# Patient Record
Sex: MALE | URBAN - METROPOLITAN AREA
[De-identification: names, ages, dates, MRNs, and addresses within clinical notes are randomized per-mention and may not be internally consistent; named-entity substitution may affect disease eponyms.]

---

## 2023-03-27 ENCOUNTER — APPOINTMENT (OUTPATIENT)
Dept: URBAN - METROPOLITAN AREA CLINIC 184 | Age: 73
Setting detail: DERMATOLOGY
End: 2023-03-27

## 2023-03-27 DIAGNOSIS — D22 MELANOCYTIC NEVI: ICD-10-CM

## 2023-03-27 DIAGNOSIS — L57.8 OTHER SKIN CHANGES DUE TO CHRONIC EXPOSURE TO NONIONIZING RADIATION: ICD-10-CM

## 2023-03-27 DIAGNOSIS — L81.4 OTHER MELANIN HYPERPIGMENTATION: ICD-10-CM

## 2023-03-27 DIAGNOSIS — L82.1 OTHER SEBORRHEIC KERATOSIS: ICD-10-CM

## 2023-03-27 DIAGNOSIS — L57.0 ACTINIC KERATOSIS: ICD-10-CM

## 2023-03-27 DIAGNOSIS — Z85.828 PERSONAL HISTORY OF OTHER MALIGNANT NEOPLASM OF SKIN: ICD-10-CM

## 2023-03-27 DIAGNOSIS — D18.0 HEMANGIOMA: ICD-10-CM

## 2023-03-27 PROBLEM — D18.01 HEMANGIOMA OF SKIN AND SUBCUTANEOUS TISSUE: Status: ACTIVE | Noted: 2023-03-27

## 2023-03-27 PROBLEM — D22.39 MELANOCYTIC NEVI OF OTHER PARTS OF FACE: Status: ACTIVE | Noted: 2023-03-27

## 2023-03-27 PROBLEM — D22.61 MELANOCYTIC NEVI OF RIGHT UPPER LIMB, INCLUDING SHOULDER: Status: ACTIVE | Noted: 2023-03-27

## 2023-03-27 PROBLEM — D22.62 MELANOCYTIC NEVI OF LEFT UPPER LIMB, INCLUDING SHOULDER: Status: ACTIVE | Noted: 2023-03-27

## 2023-03-27 PROBLEM — D22.5 MELANOCYTIC NEVI OF TRUNK: Status: ACTIVE | Noted: 2023-03-27

## 2023-03-27 PROCEDURE — 17003 DESTRUCT PREMALG LES 2-14: CPT

## 2023-03-27 PROCEDURE — OTHER MIPS QUALITY: OTHER

## 2023-03-27 PROCEDURE — OTHER COUNSELING: OTHER

## 2023-03-27 PROCEDURE — 17000 DESTRUCT PREMALG LESION: CPT

## 2023-03-27 PROCEDURE — 99213 OFFICE O/P EST LOW 20 MIN: CPT | Mod: 25

## 2023-03-27 PROCEDURE — OTHER SUNSCREEN RECOMMENDATIONS: OTHER

## 2023-03-27 PROCEDURE — OTHER LIQUID NITROGEN: OTHER

## 2023-03-27 PROCEDURE — OTHER OBSERVATION: OTHER

## 2023-03-27 ASSESSMENT — LOCATION SIMPLE DESCRIPTION DERM
LOCATION SIMPLE: RIGHT FOREHEAD
LOCATION SIMPLE: SCALP
LOCATION SIMPLE: RIGHT UPPER ARM
LOCATION SIMPLE: RIGHT EAR
LOCATION SIMPLE: INFERIOR FOREHEAD
LOCATION SIMPLE: LEFT FOREHEAD
LOCATION SIMPLE: CHEST
LOCATION SIMPLE: LEFT UPPER ARM

## 2023-03-27 ASSESSMENT — LOCATION ZONE DERM
LOCATION ZONE: TRUNK
LOCATION ZONE: EAR
LOCATION ZONE: ARM
LOCATION ZONE: SCALP
LOCATION ZONE: FACE

## 2023-03-27 ASSESSMENT — LOCATION DETAILED DESCRIPTION DERM
LOCATION DETAILED: LEFT CENTRAL PARIETAL SCALP
LOCATION DETAILED: STERNUM
LOCATION DETAILED: INFERIOR MID FOREHEAD
LOCATION DETAILED: RIGHT FOREHEAD
LOCATION DETAILED: LEFT INFERIOR FOREHEAD
LOCATION DETAILED: LEFT SUPERIOR PARIETAL SCALP
LOCATION DETAILED: LEFT MEDIAL INFERIOR CHEST
LOCATION DETAILED: LEFT PROXIMAL POSTERIOR UPPER ARM
LOCATION DETAILED: RIGHT PROXIMAL POSTERIOR UPPER ARM
LOCATION DETAILED: RIGHT ANTIHELIX

## 2023-03-27 NOTE — PROCEDURE: LIQUID NITROGEN
Show Applicator Variable?: Yes
Render Note In Bullet Format When Appropriate: No
Post-Care Instructions: I reviewed with the patient in detail post-care instructions. Patient is to wear sunprotection, and avoid picking at any of the treated lesions. Pt may apply Vaseline to crusted or scabbing areas.
Duration Of Freeze Thaw-Cycle (Seconds): 2
Detail Level: Detailed
Consent: The patient's consent was obtained including but not limited to risks of crusting, scabbing, blistering, scarring, darker or lighter pigmentary change, recurrence, incomplete removal and infection.

## 2023-03-27 NOTE — PROCEDURE: MIPS QUALITY
Detail Level: Detailed
Quality 431: Preventive Care And Screening: Unhealthy Alcohol Use - Screening: Patient not identified as an unhealthy alcohol user when screened for unhealthy alcohol use using a systematic screening method
Quality 226: Preventive Care And Screening: Tobacco Use: Screening And Cessation Intervention: Tobacco Screening not Performed
Quality 130: Documentation Of Current Medications In The Medical Record: Current Medications Documented

## 2023-03-27 NOTE — PROCEDURE: OBSERVATION
Body Location Override (Optional - Billing Will Still Be Based On Selected Body Map Location If Applicable): right forehead
Detail Level: Detailed
Size Of Lesion In Cm (Optional): 0

## 2024-05-08 ENCOUNTER — APPOINTMENT (OUTPATIENT)
Dept: URBAN - METROPOLITAN AREA CLINIC 184 | Age: 74
Setting detail: DERMATOLOGY
End: 2024-05-08

## 2024-05-08 DIAGNOSIS — D22 MELANOCYTIC NEVI: ICD-10-CM

## 2024-05-08 DIAGNOSIS — L81.4 OTHER MELANIN HYPERPIGMENTATION: ICD-10-CM

## 2024-05-08 DIAGNOSIS — L57.0 ACTINIC KERATOSIS: ICD-10-CM

## 2024-05-08 DIAGNOSIS — D18.0 HEMANGIOMA: ICD-10-CM

## 2024-05-08 DIAGNOSIS — L57.8 OTHER SKIN CHANGES DUE TO CHRONIC EXPOSURE TO NONIONIZING RADIATION: ICD-10-CM

## 2024-05-08 DIAGNOSIS — L82.1 OTHER SEBORRHEIC KERATOSIS: ICD-10-CM

## 2024-05-08 DIAGNOSIS — L72.8 OTHER FOLLICULAR CYSTS OF THE SKIN AND SUBCUTANEOUS TISSUE: ICD-10-CM

## 2024-05-08 PROBLEM — D18.01 HEMANGIOMA OF SKIN AND SUBCUTANEOUS TISSUE: Status: ACTIVE | Noted: 2024-05-08

## 2024-05-08 PROBLEM — D22.9 MELANOCYTIC NEVI, UNSPECIFIED: Status: ACTIVE | Noted: 2024-05-08

## 2024-05-08 PROCEDURE — OTHER COUNSELING: OTHER

## 2024-05-08 PROCEDURE — 99213 OFFICE O/P EST LOW 20 MIN: CPT | Mod: 25

## 2024-05-08 PROCEDURE — OTHER SUNSCREEN RECOMMENDATIONS: OTHER

## 2024-05-08 PROCEDURE — 17003 DESTRUCT PREMALG LES 2-14: CPT

## 2024-05-08 PROCEDURE — OTHER MIPS QUALITY: OTHER

## 2024-05-08 PROCEDURE — 17000 DESTRUCT PREMALG LESION: CPT

## 2024-05-08 PROCEDURE — OTHER LIQUID NITROGEN: OTHER

## 2024-05-08 ASSESSMENT — LOCATION ZONE DERM
LOCATION ZONE: TRUNK
LOCATION ZONE: SCALP

## 2024-05-08 ASSESSMENT — LOCATION DETAILED DESCRIPTION DERM
LOCATION DETAILED: RIGHT SUPERIOR PARIETAL SCALP
LOCATION DETAILED: LEFT SUPERIOR PARIETAL SCALP
LOCATION DETAILED: LEFT INFERIOR UPPER BACK
LOCATION DETAILED: POSTERIOR MID-PARIETAL SCALP

## 2024-05-08 ASSESSMENT — LOCATION SIMPLE DESCRIPTION DERM
LOCATION SIMPLE: LEFT UPPER BACK
LOCATION SIMPLE: POSTERIOR SCALP
LOCATION SIMPLE: SCALP

## 2024-05-08 NOTE — PROCEDURE: COUNSELING
Detail Level: Detailed
Patient Specific Counseling (Will Not Stick From Patient To Patient): Can remove if pt would like, but doesn’t need to be.

## 2024-05-08 NOTE — PROCEDURE: LIQUID NITROGEN
Consent: The patient's consent was obtained including but not limited to risks of crusting, scabbing, blistering, scarring, darker or lighter pigmentary change, recurrence, incomplete removal and infection.
Show Aperture Variable?: Yes
Post-Care Instructions: I reviewed with the patient in detail post-care instructions. Patient is to wear sunprotection, and avoid picking at any of the treated lesions. Pt may apply Vaseline to crusted or scabbing areas.
Application Tool (Optional): Liquid Nitrogen Sprayer
Duration Of Freeze Thaw-Cycle (Seconds): 5
Render Note In Bullet Format When Appropriate: No
Detail Level: Detailed
Number Of Freeze-Thaw Cycles: 1 freeze-thaw cycle

## 2025-06-16 ENCOUNTER — APPOINTMENT (OUTPATIENT)
Dept: URBAN - METROPOLITAN AREA CLINIC 184 | Age: 75
Setting detail: DERMATOLOGY
End: 2025-06-16

## 2025-06-16 DIAGNOSIS — L81.4 OTHER MELANIN HYPERPIGMENTATION: ICD-10-CM

## 2025-06-16 DIAGNOSIS — L57.8 OTHER SKIN CHANGES DUE TO CHRONIC EXPOSURE TO NONIONIZING RADIATION: ICD-10-CM

## 2025-06-16 DIAGNOSIS — D485 NEOPLASM OF UNCERTAIN BEHAVIOR OF SKIN: ICD-10-CM

## 2025-06-16 DIAGNOSIS — D22 MELANOCYTIC NEVI: ICD-10-CM

## 2025-06-16 DIAGNOSIS — Z85.828 PERSONAL HISTORY OF OTHER MALIGNANT NEOPLASM OF SKIN: ICD-10-CM

## 2025-06-16 DIAGNOSIS — D18.0 HEMANGIOMA: ICD-10-CM

## 2025-06-16 DIAGNOSIS — L82.1 OTHER SEBORRHEIC KERATOSIS: ICD-10-CM

## 2025-06-16 PROBLEM — D18.01 HEMANGIOMA OF SKIN AND SUBCUTANEOUS TISSUE: Status: ACTIVE | Noted: 2025-06-16

## 2025-06-16 PROBLEM — D48.5 NEOPLASM OF UNCERTAIN BEHAVIOR OF SKIN: Status: ACTIVE | Noted: 2025-06-16

## 2025-06-16 PROBLEM — D22.4 MELANOCYTIC NEVI OF SCALP AND NECK: Status: ACTIVE | Noted: 2025-06-16

## 2025-06-16 PROCEDURE — OTHER SUNSCREEN RECOMMENDATIONS: OTHER

## 2025-06-16 PROCEDURE — OTHER OBSERVATION: OTHER

## 2025-06-16 PROCEDURE — 99213 OFFICE O/P EST LOW 20 MIN: CPT | Mod: 25

## 2025-06-16 PROCEDURE — OTHER BIOPSY BY SHAVE METHOD: OTHER

## 2025-06-16 PROCEDURE — OTHER MIPS QUALITY: OTHER

## 2025-06-16 PROCEDURE — 11102 TANGNTL BX SKIN SINGLE LES: CPT

## 2025-06-16 PROCEDURE — OTHER COUNSELING: OTHER

## 2025-06-16 ASSESSMENT — LOCATION DETAILED DESCRIPTION DERM
LOCATION DETAILED: LEFT DISTAL RADIAL DORSAL FOREARM
LOCATION DETAILED: LEFT SUPERIOR LATERAL UPPER BACK
LOCATION DETAILED: RIGHT CLAVICULAR NECK
LOCATION DETAILED: RIGHT FOREHEAD
LOCATION DETAILED: LEFT LATERAL ABDOMEN
LOCATION DETAILED: LEFT CENTRAL MALAR CHEEK
LOCATION DETAILED: RIGHT NASAL ALA

## 2025-06-16 ASSESSMENT — LOCATION SIMPLE DESCRIPTION DERM
LOCATION SIMPLE: LEFT UPPER BACK
LOCATION SIMPLE: RIGHT ANTERIOR NECK
LOCATION SIMPLE: ABDOMEN
LOCATION SIMPLE: LEFT FOREARM
LOCATION SIMPLE: LEFT CHEEK
LOCATION SIMPLE: RIGHT FOREHEAD
LOCATION SIMPLE: RIGHT NOSE

## 2025-06-16 ASSESSMENT — LOCATION ZONE DERM
LOCATION ZONE: FACE
LOCATION ZONE: ARM
LOCATION ZONE: TRUNK
LOCATION ZONE: NECK
LOCATION ZONE: NOSE

## 2025-08-13 ENCOUNTER — APPOINTMENT (OUTPATIENT)
Dept: URBAN - METROPOLITAN AREA CLINIC 184 | Age: 75
Setting detail: DERMATOLOGY
End: 2025-08-13

## 2025-08-13 VITALS — SYSTOLIC BLOOD PRESSURE: 135 MMHG | DIASTOLIC BLOOD PRESSURE: 75 MMHG

## 2025-08-13 DIAGNOSIS — L57.0 ACTINIC KERATOSIS: ICD-10-CM

## 2025-08-13 PROBLEM — C44.311 BASAL CELL CARCINOMA OF SKIN OF NOSE: Status: ACTIVE | Noted: 2025-08-13

## 2025-08-13 PROCEDURE — 14060 TIS TRNFR E/N/E/L 10 SQ CM/<: CPT

## 2025-08-13 PROCEDURE — OTHER CONSULTATION FOR MOHS SURGERY: OTHER

## 2025-08-13 PROCEDURE — 17312 MOHS ADDL STAGE: CPT

## 2025-08-13 PROCEDURE — 15260 FTH/GFT FR N/E/E/L 20 SQCM/<: CPT

## 2025-08-13 PROCEDURE — OTHER RETURN TO REFERRING PROVIDER: OTHER

## 2025-08-13 PROCEDURE — OTHER LIQUID NITROGEN: OTHER

## 2025-08-13 PROCEDURE — OTHER MOHS SURGERY: OTHER

## 2025-08-13 PROCEDURE — OTHER MIPS QUALITY: OTHER

## 2025-08-13 PROCEDURE — 17311 MOHS 1 STAGE H/N/HF/G: CPT

## 2025-08-13 PROCEDURE — 17000 DESTRUCT PREMALG LESION: CPT

## 2025-08-13 ASSESSMENT — LOCATION ZONE DERM: LOCATION ZONE: EAR

## 2025-08-13 ASSESSMENT — LOCATION DETAILED DESCRIPTION DERM: LOCATION DETAILED: RIGHT ANTIHELIX

## 2025-08-13 ASSESSMENT — LOCATION SIMPLE DESCRIPTION DERM: LOCATION SIMPLE: RIGHT EAR

## 2025-08-20 ENCOUNTER — APPOINTMENT (OUTPATIENT)
Dept: URBAN - METROPOLITAN AREA CLINIC 184 | Age: 75
Setting detail: DERMATOLOGY
End: 2025-08-21

## 2025-08-20 DIAGNOSIS — Z48.02 ENCOUNTER FOR REMOVAL OF SUTURES: ICD-10-CM

## 2025-08-20 PROCEDURE — 99024 POSTOP FOLLOW-UP VISIT: CPT

## 2025-08-20 PROCEDURE — OTHER SUTURE REMOVAL (GLOBAL PERIOD): OTHER

## 2025-08-20 ASSESSMENT — LOCATION DETAILED DESCRIPTION DERM: LOCATION DETAILED: RIGHT NASAL ALAR GROOVE

## 2025-08-20 ASSESSMENT — LOCATION ZONE DERM: LOCATION ZONE: NOSE

## 2025-08-20 ASSESSMENT — LOCATION SIMPLE DESCRIPTION DERM: LOCATION SIMPLE: RIGHT NOSE
